# Patient Record
Sex: FEMALE | Race: BLACK OR AFRICAN AMERICAN | URBAN - METROPOLITAN AREA
[De-identification: names, ages, dates, MRNs, and addresses within clinical notes are randomized per-mention and may not be internally consistent; named-entity substitution may affect disease eponyms.]

---

## 2019-02-21 ENCOUNTER — HISTORICAL (OUTPATIENT)
Dept: ADMINISTRATIVE | Facility: HOSPITAL | Age: 29
End: 2019-02-21

## 2019-02-23 LAB — FINAL CULTURE: NORMAL

## 2021-06-17 LAB
ABS NEUT (OLG): 6.45 X10(3)/MCL (ref 2.1–9.2)
BASOPHILS # BLD AUTO: 0.1 X10(3)/MCL (ref 0–0.2)
BASOPHILS NFR BLD AUTO: 1 %
EOSINOPHIL # BLD AUTO: 0.4 X10(3)/MCL (ref 0–0.9)
EOSINOPHIL NFR BLD AUTO: 4 %
ERYTHROCYTE [DISTWIDTH] IN BLOOD BY AUTOMATED COUNT: 13.7 % (ref 11.5–17)
HCT VFR BLD AUTO: 41.2 % (ref 37–47)
HGB BLD-MCNC: 12.8 GM/DL (ref 12–16)
LYMPHOCYTES # BLD AUTO: 1.6 X10(3)/MCL (ref 0.6–4.6)
LYMPHOCYTES NFR BLD AUTO: 18 %
MCH RBC QN AUTO: 27.8 PG (ref 27–31)
MCHC RBC AUTO-ENTMCNC: 31.1 GM/DL (ref 33–36)
MCV RBC AUTO: 89.4 FL (ref 80–94)
MONOCYTES # BLD AUTO: 0.6 X10(3)/MCL (ref 0.1–1.3)
MONOCYTES NFR BLD AUTO: 7 %
NEUTROPHILS # BLD AUTO: 6.45 X10(3)/MCL (ref 2.1–9.2)
NEUTROPHILS NFR BLD AUTO: 70 %
PLATELET # BLD AUTO: 319 X10(3)/MCL (ref 130–400)
PMV BLD AUTO: 9.9 FL (ref 9.4–12.4)
RBC # BLD AUTO: 4.61 X10(6)/MCL (ref 4.2–5.4)
WBC # SPEC AUTO: 9.2 X10(3)/MCL (ref 4.5–11.5)

## 2021-06-22 ENCOUNTER — HISTORICAL (OUTPATIENT)
Dept: SURGERY | Facility: HOSPITAL | Age: 31
End: 2021-06-22

## 2022-05-01 NOTE — OP NOTE
Patient:   Juanita Bird             MRN: [id number]            FIN: [id number]               Age:   31 years     Sex:  Female     :  1990   Associated Diagnoses:   None   Author:   Deborah Mancuso MD      Pre-op Diagnosis: Undesired Fertility  Postop Diagnosis:  Same    Procedure:  1.  Laparoscopic bilateral salpingectomy    Surgeon: Deborah Mancuso MD  Assistant:  Clotilde Krishnan MD  Anesthesia: General  Complications: None  EBL: Minimal  Urine Output: 25cc   IVF: 600cc  Specimen: Bilateral fallopian tubes    Findings: Laparoscopy revealed normal pelvic organs.     Indication and Consent: 31 year old female with undesired fertility. Risks, benefits and alternatives discussed with her.  The patient stated understanding and desired to proceed.  All questions were answered.      Procedure: The patient was taken to the OR, general anesthesia was obtained without difficulty.  She was placed in the dorsal lithotomy position with Kevin stirrups.  Her arms were securely tucked. She was then prepped and draped in the normal sterile fashion. A speculum was placed into the vagina.  A single tooth tenaculum was used to grasp the anterior lip of the cervix.  The uterus sounded to 8 cm. The uterine manipulator was then placed but unable to stay in place due to multiparous cervical os.  Sponge stick placed in the vagina for manipulation.     Attention was then turned to the laparoscopic portion of the procedure.  A scalpel was used to make a vertical skin incision at the umbilicus.  The Veress needle was introduced at a straight angle into the abdomen.  Intraabdominal placement was confirmed with low opening pressure and free flow of gas.  The abdomen was distended with CO2 to a pressure of 15mmHg.  An 11mm trocar was inserted into the umbilical incision under direct visualization.  Trendelenburg position obtained.  5mm trocars were placed into bilateral lower quadrants under direct laparoscopic  visualization.   Bilateral tubes visualized and easily mobilized. Harmonic scalpel used to coagulate and transect the fallopian tube from the ovary.  Then sequential transection through the mesosalpinx towards the uterus.  The  fallopian tube was transected with the Harmonic at the uterine cornua.  This was performed in a similar fashion on the contralateral side.   The specimens were retrieved thru the trocar.  Hemostasis was confirmed with low pressure.  The pneumoperitoneum was released and the trocar was removed.  The skin edges closed with Monocryl in a subcuticular fashion.  The patient tolerated the procedure well.  All instrument and sponge counts were correct times two.  The patient was awakened from general anesthesia and taken to the recovery room in stable condition.